# Patient Record
Sex: MALE | Race: WHITE | Employment: FULL TIME | URBAN - METROPOLITAN AREA
[De-identification: names, ages, dates, MRNs, and addresses within clinical notes are randomized per-mention and may not be internally consistent; named-entity substitution may affect disease eponyms.]

---

## 2022-07-16 ENCOUNTER — HOSPITAL ENCOUNTER (EMERGENCY)
Age: 62
Discharge: HOME | End: 2022-07-16
Attending: EMERGENCY MEDICINE
Payer: COMMERCIAL

## 2022-07-16 VITALS
BODY MASS INDEX: 36.46 KG/M2 | RESPIRATION RATE: 16 BRPM | OXYGEN SATURATION: 96 % | HEIGHT: 73 IN | TEMPERATURE: 98.6 F | HEART RATE: 92 BPM | SYSTOLIC BLOOD PRESSURE: 138 MMHG | DIASTOLIC BLOOD PRESSURE: 92 MMHG | WEIGHT: 275.13 LBS

## 2022-07-16 DIAGNOSIS — N28.9 RENAL IMPAIRMENT: ICD-10-CM

## 2022-07-16 DIAGNOSIS — E87.1 HYPONATREMIA: ICD-10-CM

## 2022-07-16 DIAGNOSIS — E11.65 HYPERGLYCEMIA DUE TO DIABETES MELLITUS (HCC): ICD-10-CM

## 2022-07-16 DIAGNOSIS — R53.83 FATIGUE, UNSPECIFIED TYPE: Primary | ICD-10-CM

## 2022-07-16 DIAGNOSIS — Z93.2 ILEOSTOMY IN PLACE (HCC): ICD-10-CM

## 2022-07-16 LAB
A/G RATIO: 1.1 (ref 1.1–2.2)
ALBUMIN SERPL-MCNC: 5.1 G/DL (ref 3.4–5)
ALP BLD-CCNC: 143 U/L (ref 40–129)
ALT SERPL-CCNC: 91 U/L (ref 10–40)
ANION GAP SERPL CALCULATED.3IONS-SCNC: 17 MMOL/L (ref 3–16)
AST SERPL-CCNC: 58 U/L (ref 15–37)
BACTERIA: ABNORMAL /HPF
BASOPHILS ABSOLUTE: 0 K/UL (ref 0–0.2)
BASOPHILS RELATIVE PERCENT: 0.4 %
BILIRUB SERPL-MCNC: 0.8 MG/DL (ref 0–1)
BILIRUBIN URINE: NEGATIVE
BLOOD, URINE: NEGATIVE
BUN BLDV-MCNC: 37 MG/DL (ref 7–20)
CALCIUM SERPL-MCNC: 10 MG/DL (ref 8.3–10.6)
CHLORIDE BLD-SCNC: 96 MMOL/L (ref 99–110)
CLARITY: CLEAR
CO2: 16 MMOL/L (ref 21–32)
COLOR: YELLOW
CREAT SERPL-MCNC: 1.4 MG/DL (ref 0.8–1.3)
EOSINOPHILS ABSOLUTE: 0.4 K/UL (ref 0–0.6)
EOSINOPHILS RELATIVE PERCENT: 3.4 %
EPITHELIAL CELLS, UA: 0 /HPF (ref 0–5)
GFR AFRICAN AMERICAN: >60
GFR NON-AFRICAN AMERICAN: 52
GLUCOSE BLD-MCNC: 233 MG/DL (ref 70–99)
GLUCOSE URINE: >=1000 MG/DL
HCT VFR BLD CALC: 58 % (ref 40.5–52.5)
HEMOGLOBIN: 19.8 G/DL (ref 13.5–17.5)
HYALINE CASTS: 15 /LPF (ref 0–8)
HYALINE CASTS: PRESENT
KETONES, URINE: ABNORMAL MG/DL
LACTIC ACID: 2 MMOL/L (ref 0.4–2)
LEUKOCYTE ESTERASE, URINE: NEGATIVE
LIPASE: 46 U/L (ref 13–60)
LYMPHOCYTES ABSOLUTE: 2.2 K/UL (ref 1–5.1)
LYMPHOCYTES RELATIVE PERCENT: 19.7 %
MCH RBC QN AUTO: 30.1 PG (ref 26–34)
MCHC RBC AUTO-ENTMCNC: 34.1 G/DL (ref 31–36)
MCV RBC AUTO: 88.2 FL (ref 80–100)
MICROSCOPIC EXAMINATION: YES
MONOCYTES ABSOLUTE: 1.1 K/UL (ref 0–1.3)
MONOCYTES RELATIVE PERCENT: 9.8 %
NEUTROPHILS ABSOLUTE: 7.5 K/UL (ref 1.7–7.7)
NEUTROPHILS RELATIVE PERCENT: 66.7 %
NITRITE, URINE: NEGATIVE
PDW BLD-RTO: 14.7 % (ref 12.4–15.4)
PH UA: 6 (ref 5–8)
PLATELET # BLD: 222 K/UL (ref 135–450)
PMV BLD AUTO: 7 FL (ref 5–10.5)
POTASSIUM REFLEX MAGNESIUM: 5.1 MMOL/L (ref 3.5–5.1)
PROTEIN UA: 30 MG/DL
RBC # BLD: 6.57 M/UL (ref 4.2–5.9)
RBC UA: 2 /HPF (ref 0–4)
SARS-COV-2, NAAT: NOT DETECTED
SODIUM BLD-SCNC: 129 MMOL/L (ref 136–145)
SPECIFIC GRAVITY UA: 1.04 (ref 1–1.03)
TOTAL PROTEIN: 9.9 G/DL (ref 6.4–8.2)
URINE REFLEX TO CULTURE: ABNORMAL
URINE TYPE: ABNORMAL
UROBILINOGEN, URINE: 0.2 E.U./DL
WBC # BLD: 11.2 K/UL (ref 4–11)
WBC UA: 1 /HPF (ref 0–5)

## 2022-07-16 PROCEDURE — 83605 ASSAY OF LACTIC ACID: CPT

## 2022-07-16 PROCEDURE — 85025 COMPLETE CBC W/AUTO DIFF WBC: CPT

## 2022-07-16 PROCEDURE — 81001 URINALYSIS AUTO W/SCOPE: CPT

## 2022-07-16 PROCEDURE — 36415 COLL VENOUS BLD VENIPUNCTURE: CPT

## 2022-07-16 PROCEDURE — 2580000003 HC RX 258: Performed by: PHYSICIAN ASSISTANT

## 2022-07-16 PROCEDURE — 87635 SARS-COV-2 COVID-19 AMP PRB: CPT

## 2022-07-16 PROCEDURE — 99284 EMERGENCY DEPT VISIT MOD MDM: CPT

## 2022-07-16 PROCEDURE — 83690 ASSAY OF LIPASE: CPT

## 2022-07-16 PROCEDURE — 80053 COMPREHEN METABOLIC PANEL: CPT

## 2022-07-16 RX ORDER — 0.9 % SODIUM CHLORIDE 0.9 %
1000 INTRAVENOUS SOLUTION INTRAVENOUS ONCE
Status: COMPLETED | OUTPATIENT
Start: 2022-07-16 | End: 2022-07-16

## 2022-07-16 RX ADMIN — SODIUM CHLORIDE 1000 ML: 9 INJECTION, SOLUTION INTRAVENOUS at 18:58

## 2022-07-16 RX ADMIN — SODIUM CHLORIDE 1000 ML: 9 INJECTION, SOLUTION INTRAVENOUS at 17:30

## 2022-07-16 ASSESSMENT — PAIN SCALES - GENERAL
PAINLEVEL_OUTOF10: 0
PAINLEVEL_OUTOF10: 0

## 2022-07-16 ASSESSMENT — PAIN - FUNCTIONAL ASSESSMENT: PAIN_FUNCTIONAL_ASSESSMENT: 0-10

## 2022-07-16 NOTE — ED PROVIDER NOTES
Attending Supervisory Note/Shared Visit   I have personally performed a face to face diagnostic evaluation on this patient. I have reviewed the mid-levels findings and agree. History and Exam by me shows alert white male in no acute distress. He is here today for increased liquid stool in his ileostomy. This patient is status post like to me for cancer. History of Crohn's disease. Returns he is dehydrated. He states has had multiple episodes in the past where he develops a large amount of liquid stool output in his ileostomy. He quickly becomes dehydrated. He feels fatigued. He has Imodium which he states he was told he could take up to 4 or 3 times a day. He is taking 3 3 times a day with no decrease in output in his ileostomy. No significant abdominal pain. General: Alert well-appearing male in no acute distress. Heart: Regular rate and rhythm. No murmurs or gallops noted. Lungs: Breath sounds equal bilaterally and clear. Abdomen: Soft, nondistended, nontender. Ileostomy in the right mid abdomen. Light brown liquid stool in the ileostomy. Lab reviewed. H&H of 19.8 and 58.0. White blood count 11,200 with 67 neutrophils and 20 lymphs. Sodium 129 with a potassium of 5.1. Bicarb of 16 with an anion gap of 17. BUN of 37 with a creatinine of 1.4. Glucose of 233. ALT of 91 with an AST of 58. Lipase of 46. Urinalysis shows glucose of greater than thousand, trace of ketones, specific gravity 1.037. Patient will be given 2 L of normal saline. Once he is hydrated he will be discharged. He has been to follow-up in a few days when he gets back home to [place]. He understands he will likely need some follow-up lab and may need some repeat hydration if he continues to have these liquid stools. He has had this happen numerous times in the past and stands need for follow-up.       (Please note that portions of this note were completed with a voice recognition program.  Efforts were made to edit the dictations but occasionally words are mis-transcribed.)    Carlene Flower MD  Attending Emergency Physician       Marycruz Sarmiento MD  07/16/22 5246

## 2022-07-16 NOTE — ED PROVIDER NOTES
[address] [facility]        Pt Name: Jorgito Ziegler  MRN: [id number]  [place] 1960  Date of evaluation: 7/16/2022  Provider: JUANCARLOS Ramon  PCP: No primary care provider on file. Note Started: 1:21 PM EDT     This patient was also seen and evaluated by the attending physician Dheeraj Romero MD.    [address] [place] [address]       Chief Complaint   Patient presents with    Diarrhea     States colon was removed from cancer in remission, has chrons disease, states believes he is dehydrated  states this is day 3 or 4  states has increased immodium, and unable to keep enough fluids down GI MD is through [place] but here for the death of his mother and ECF placement of father        HISTORY OF PRESENT ILLNESS   (Location, Timing/Onset, Context/Setting, Quality, Duration, Modifying Factors, Severity, Associated Signs and Symptoms)  Note limiting factors. Chief Complaint: Feeling generally weak and tired, abnormal stool in colostomy bag    Jorgito Ziegler is a 62 y.o. male who presents reporting a history of colon cancer in remission with total colectomy and permanent ileostomy, saying that over the last few days he has been having something like diarrhea, with the stool and his collecting bag being watery and almost clear, and during this time he is also been feeling generally worn out, weak and tired. He says this recently has been happening to him approximately once a year, and usually he gets checked out and get some IV fluids and feels better. Says he thinks he is dehydrated, although he has been eating and drinking normally. He denies any abdominal pain, nausea or vomiting. No urinary complaints. Denies cough, cold symptoms, shortness of breath, chest pain or fever. Nursing Notes were all reviewed and agreed with or any disagreements were addressed in the HPI.     REVIEW OF SYSTEMS    (2-9 systems for level 4, 10 or more for level 5) Review of Systems    Positives and pertinent negatives as per HPI. PAST MEDICAL HISTORY     Past Medical History:   Diagnosis Date    Cancer ([facility] [address]     Crohn's disease ([facility] [address]     Diabetes mellitus ([facility] [address]     Sleep apnea        SURGICAL HISTORY     Past Surgical History:   Procedure Laterality Date    CHOLECYSTECTOMY      HERNIA REPAIR      SIGMOID COLECTOMY         CURRENTMEDICATIONS       Previous Medications    No medications on file       ALLERGIES     Azithromycin and Remicade [infliximab]    FAMILYHISTORY     History reviewed. No pertinent family history. SOCIAL HISTORY       Social History     Tobacco Use    Smoking status: Never    Smokeless tobacco: Never   Substance Use Topics    Alcohol use: Not Currently    Drug use: Not Currently       SCREENINGS    [place]isa Coma Scale  Eye Opening: Spontaneous  Best Verbal Response: Oriented  Best Motor Response: Obeys commands  [place] Coma Scale Score: 15      PHYSICAL EXAM    (up to 7 for level 4, 8 or more for level 5)     ED Triage Vitals [07/16/22 1301]   BP Temp Temp src Heart Rate Resp SpO2 Height Weight   (!) 143/100 98.6 °F (37 °C) -- 94 15 96 % 6' 1\" (1.854 m) 275 lb 2.2 oz (124.8 kg)       Physical Exam  Vitals and nursing note reviewed. Constitutional:       General: He is not in acute distress. Appearance: Normal appearance. He is not ill-appearing. HENT:      Head: Normocephalic and atraumatic. Nose: Nose normal.   Eyes:      General:         Right eye: No discharge. Left eye: No discharge. Cardiovascular:      Rate and Rhythm: Normal rate and regular rhythm. Heart sounds: Normal heart sounds. No murmur heard. No gallop. Pulmonary:      Effort: Pulmonary effort is normal. No respiratory distress. Breath sounds: Normal breath sounds. No stridor. No wheezing, rhonchi or rales. Abdominal:      General: Bowel sounds are normal. There is no distension. Palpations: Abdomen is soft. There is no mass. Tenderness: no abdominal tenderness There is no guarding or rebound. Comments: Ileostomy site appears normal, with watery yellowish-brown fluid in collection bag. Musculoskeletal:         General: Normal range of motion. Cervical back: Normal range of motion. Skin:     General: Skin is warm and dry. Neurological:      General: No focal deficit present. Mental Status: He is alert and oriented to person, place, and time. Psychiatric:         Mood and Affect: Mood normal.         Behavior: Behavior normal.       DIAGNOSTIC RESULTS   LABS:    Labs Reviewed   URINALYSIS WITH REFLEX TO CULTURE - Abnormal; Notable for the following components:       Result Value    Glucose, Ur >=1000 (*)     Ketones, Urine TRACE (*)     Protein, UA 30 (*)     All other components within normal limits   CBC WITH AUTO DIFFERENTIAL - Abnormal; Notable for the following components:    WBC 11.2 (*)     RBC 6.57 (*)     Hemoglobin 19.8 (*)     Hematocrit 58.0 (*)     All other components within normal limits   COMPREHENSIVE METABOLIC PANEL W/ REFLEX TO MG FOR LOW K - Abnormal; Notable for the following components:    Sodium 129 (*)     Chloride 96 (*)     CO2 16 (*)     Anion Gap 17 (*)     Glucose 233 (*)     BUN 37 (*)     CREATININE 1.4 (*)     GFR Non- 52 (*)     Total Protein 9.9 (*)     Albumin 5.1 (*)     Alkaline Phosphatase 143 (*)     ALT 91 (*)     AST 58 (*)     All other components within normal limits   MICROSCOPIC URINALYSIS - Abnormal; Notable for the following components:    Hyaline Casts, UA 15 (*)     Hyaline Casts, UA Present (*)     All other components within normal limits   COVID-19, RAPID   LACTIC ACID   LIPASE       When ordered only abnormal lab results are displayed. All other labs were within normal range or not returned as of this dictation. EKG:  When ordered, EKG's are interpreted by the Emergency Department Physician in the absence of a cardiologist.  Please see their note for interpretation of EKG. RADIOLOGY:   Non-plain film images such as CT, Ultrasound and MRI are read by the radiologist. Plain radiographic images are visualized and preliminarily interpreted by the ED Provider with the below findings:    Interpretation per the Radiologist below, if available at the time of this note:    No orders to display       CONSULTS:  None    PROCEDURES   Unless otherwise noted below, none. Procedures    EMERGENCY DEPARTMENT COURSE and DIFFERENTIAL DIAGNOSIS/MDM:   Vitals:    Vitals:    07/16/22 1301 07/16/22 1730   BP: (!) 143/100 (!) 138/92   Pulse: 94 92   Resp: 15 16   Temp: 98.6 °F (37 °C)    SpO2: 96%    Weight: 275 lb 2.2 oz (124.8 kg)    Height: 6' 1\" (1.854 m)        Patient was given the following medications:  Medications   0.9 % sodium chloride bolus (has no administration in time range)   0.9 % sodium chloride bolus (0 mLs IntraVENous Stopped 7/16/22 1843)           Is this patient to be included in the SEP-1 Core Measure due to severe sepsis or septic shock? No   Exclusion criteria - the patient is NOT to be included for SEP-1 Core Measure due to: Infection is not suspected    Patient had essentially normal vital signs, and normal physical exam.  No complaints of abdominal pain. Ileostomy site looks good. Work-up was notable for hyperglycemia with glucose of 3, and the patient does have a diabetes diagnosis. Kidney function was slightly impaired with a creatinine of 1.4, and recent creatinine values from March of this year were below 1. Slightly elevated anion gap at 17, hyponatremia at 129. H&H was high, as it has been in recent results, at 19.8/58.0. Patient was feeling generally well, however, and he was given 2 L of IV fluids in the ED. He reported feeling somewhat better. There did not appear to be any indication for hospitalization at this time.   He is in town briefly, gets his medical care in [place], and says he is returning home probably in 4 to 5 days.  Patient was discharged with instructions to follow-up with his primary care doctor soon as he can when he gets home or return here if symptoms worsen in the meantime. The patient verbalized understanding and agreement with this plan of care. The patient was advised to return to the emergency department if symptoms should significantly worsen or if new and concerning symptoms should appear. I estimate there is LOW risk for ACUTE APPENDICITIS, BOWEL OBSTRUCTION, CHOLECYSTITIS, DIVERTICULITIS, INCARCERATED HERNIA, PANCREATITIS, PERFORATED BOWEL, BOWEL ISCHEMIA, PERITONITIS, SEPSIS, or CARDIAC ISCHEMIA, thus I consider the discharge disposition reasonable. CRITICAL CARE TIME   None    FINAL IMPRESSION      1. Fatigue, unspecified type    2. Ileostomy in place [facility])    3. Renal impairment    4. Hyponatremia    5.  Hyperglycemia due to diabetes mellitus [facility])          DISPOSITION/PLAN   DISPOSITION Discharge - Pending Orders Complete 07/16/2022 06:45:46 PM      PATIENT REFERRED TO:  your family doctor or primary care provider    Schedule an appointment as soon as possible for a visit   for follow-up care    DISCHARGE MEDICATIONS:  New Prescriptions    No medications on file       DISCONTINUED MEDICATIONS:  Discontinued Medications    No medications on file            (Please note that portions of this note were completed with a voice recognition program.  Efforts were made to edit the dictations but occasionally words are mis-transcribed.)    JUANCARLOS Vogel (electronically signed)        Brandy Davis, [address]  07/16/22 9999

## 2022-07-16 NOTE — ED NOTES
Introduce myself to the patient, identification band inplace, stretcher in the lowest position for safety, and the call light is in reach. Updated patient on Emergency Department plan of care, no additional needs at this time, will continue to monitor patient.        Martha Degroot RN  07/16/22 4961

## 2022-07-16 NOTE — ED NOTES
Discharge and education instructions reviewed. Patient verbalized understanding, teach-back successful. Patient denied questions at this time. No acute distress noted. Patient instructed to follow-up as noted - return to emergency department if symptoms worsen. Patient verbalized understanding. Discharged per EDMD with discharged instructions.        Cecil Alston RN  07/16/22 1955